# Patient Record
Sex: MALE | Race: WHITE | Employment: UNEMPLOYED | URBAN - METROPOLITAN AREA
[De-identification: names, ages, dates, MRNs, and addresses within clinical notes are randomized per-mention and may not be internally consistent; named-entity substitution may affect disease eponyms.]

---

## 2017-03-27 ENCOUNTER — DOCUMENTATION ONLY (OUTPATIENT)
Dept: NEUROLOGY | Age: 42
End: 2017-03-27

## 2017-06-29 ENCOUNTER — OFFICE VISIT (OUTPATIENT)
Dept: NEUROLOGY | Age: 42
End: 2017-06-29

## 2017-06-29 VITALS
HEIGHT: 77 IN | DIASTOLIC BLOOD PRESSURE: 62 MMHG | BODY MASS INDEX: 19.84 KG/M2 | WEIGHT: 168 LBS | RESPIRATION RATE: 20 BRPM | SYSTOLIC BLOOD PRESSURE: 110 MMHG

## 2017-06-29 DIAGNOSIS — Z09 FOLLOW-UP EXAMINATION FOLLOWING TREATMENT WITH HIGH-RISK MEDICATION: ICD-10-CM

## 2017-06-29 DIAGNOSIS — M85.80 OSTEOPENIA, UNSPECIFIED LOCATION: ICD-10-CM

## 2017-06-29 DIAGNOSIS — R25.1 TREMOR: ICD-10-CM

## 2017-06-29 DIAGNOSIS — G40.209 PARTIAL EPILEPSY WITH IMPAIRMENT OF CONSCIOUSNESS (HCC): Primary | ICD-10-CM

## 2017-06-29 RX ORDER — PROPRANOLOL HYDROCHLORIDE 60 MG/1
60 CAPSULE, EXTENDED RELEASE ORAL DAILY
Qty: 30 CAP | Refills: 6 | Status: SHIPPED | OUTPATIENT
Start: 2017-06-29 | End: 2018-02-27 | Stop reason: SDUPTHER

## 2017-06-29 RX ORDER — LEVETIRACETAM 750 MG/1
TABLET ORAL
Qty: 90 TAB | Refills: 6 | Status: SHIPPED | OUTPATIENT
Start: 2017-06-29 | End: 2018-02-27 | Stop reason: SDUPTHER

## 2017-06-29 RX ORDER — DIVALPROEX SODIUM 500 MG/1
500 TABLET, EXTENDED RELEASE ORAL 2 TIMES DAILY
Qty: 60 TAB | Refills: 6 | Status: SHIPPED | OUTPATIENT
Start: 2017-06-29 | End: 2018-02-27 | Stop reason: SDUPTHER

## 2017-06-29 RX ORDER — ALBUTEROL SULFATE 90 UG/1
AEROSOL, METERED RESPIRATORY (INHALATION)
COMMUNITY

## 2017-06-29 RX ORDER — PRIMIDONE 250 MG/1
250 TABLET ORAL 2 TIMES DAILY
Qty: 60 TAB | Refills: 6 | Status: SHIPPED | OUTPATIENT
Start: 2017-06-29 | End: 2019-02-26 | Stop reason: SDUPTHER

## 2017-06-29 NOTE — PROGRESS NOTES
Neurology Consult      Subjective:      Argenis Yang is a 43 y.o. male  Seen today for the follow up for  generalized seizure disorder and tremor. He has not had any seizures. He will have more tremor with extra caffeine. Current AEDs:   Keppra 750/1500   VPA /500    He is on primidone and propranolol. He is doing well with this. He is fishing a lot. He catches white birch and catfish. Patient reports he is taking his medications properly. We did call [facility] and they said he has not filled prescription in 1 year. Difficult to know if this is the case or not. Will send in refills today and check levels. Recap:  He is doing well. No seizures. He has been fishing still. His tremor is okay. Some days his tremor is worse some days than others. He says it doesn't bother him. Balance is okay. Review of AEDs show that his levels are low. We discussed taking meds appropriately. Pt states he does. Current AEDs:        Current Outpatient Prescriptions   Medication Sig Dispense Refill    albuterol (PROVENTIL HFA, VENTOLIN HFA, PROAIR HFA) 90 mcg/actuation inhaler Take  by inhalation.  divalproex ER (DEPAKOTE ER) 500 mg ER tablet Take 1 Tab by mouth two (2) times a day. 60 Tab 6    levETIRAcetam (KEPPRA) 750 mg tablet Take one tablet in the morning and two tablets at night 90 Tab 6    primidone (MYSOLINE) 250 mg tablet Take 1 Tab by mouth two (2) times a day. 60 Tab 6    sertraline (ZOLOFT) 100 mg tablet Take  by mouth daily.  traZODone (DESYREL) 50 mg tablet Take  by mouth nightly.  levETIRAcetam (KEPPRA) 500 mg tablet Take 2 Tabs by mouth every morning. 90 Tab 3    propranolol LA (INDERAL LA) 60 mg SR capsule Take 1 Cap by mouth daily.  30 Cap 6    propranolol (INDERAL) 40 mg tablet take 1 tablet by mouth once daily 90 Tab 3      No Known Allergies  Past Medical History:   Diagnosis Date    Unspecified epilepsy without mention of intractable epilepsy     essential tremor   MR    Past Surgical History:   Procedure Laterality Date    HX ORTHOPAEDIC        Social Hx:  Smokes tobacco 1 ppd for years  etoh none  Drugs    Family history: Mother is 71 y/o with  Lupus  Father  at age 71 of MI  2 brother and they are healthy. DEXA scan: below normal range for age     No family history on file. Visit Vitals    /62    Resp 20    Ht 6' 5\" (1.956 m)    Wt 76.2 kg (168 lb)    BMI 19.92 kg/m2        Review of Systems:   A comprehensive review of systems was negative except for that written in the History of Present Illness. Keppra 8/15: none detected  VPA 8/15: 27  Neuro Exam:     Appearance: The patient is well developed, well nourished, provides a coherent history and is in no acute distress. Mental Status: Oriented to time, place and person. Mood and affect appropriate. Cranial Nerves:   Intact visual fields. Fundi are benign. GIOVANI, EOM's full, no nystagmus, no ptosis. Facial sensation is normal. Corneal reflexes are intact. Facial movement is symmetric. Hearing is normal bilaterally. Palate is midline with normal sternocleidomastoid and trapezius muscles are normal. Tongue is midline. Motor:  5/5 strength in upper and lower proximal and distal muscles. Normal bulk and tone. No fasciculations. Reflexes:   Deep tendon reflexes 1+/4 and symmetrical.   Sensory:   Grossly intact   Gait:  Normal gait. Tremor:   Mild head tremor   Cerebellar:  No cerebellar signs present. Neurovascular:  Normal heart sounds and regular rhythm, peripheral pulses intact, and no carotid bruits. Assessment:   Mr. Narendra Fox was seen today for the evaluation of generalized seizures and tremors. No seizures reported, and tolerating medication well. Cont current doses of keppra and Depakote. Pt states compliant. No toxic side effects. Will not change doses today. Tremors stable.       Plan:     -continuing on Keppra 750/1500 and Depakote 500/500 for seizures.  -cont Inderal LA 60mg daily and primidone 250mg BID for tremors. -patient was counseled to stop smoking tobacco.  -DEXA scan below normal range- will do Vit D and Calcium  -Pt counseled on taking meds appropriately  -recheck levels today    -follow up in 1 year    Signed by :  Joao Pacheco MD 6/29/2017     This note was created using voice recognition software. Despite editing, there may be syntax errors. This note will not be viewable in [address]

## 2017-06-29 NOTE — PATIENT INSTRUCTIONS
Learning About Living Trav  What is a living will? A living will is a legal form you use to write down the kind of care you want at the end of your life. It is used by the health professionals who will treat you if you aren't able to decide for yourself. If you put your wishes in writing, your loved ones and others will know what kind of care you want. They won't need to guess. This can ease your mind and be helpful to others. A living will is not the same as an estate or property will. An estate will explains what you want to happen with your money and property after you die. Is a living will a legal document? A living will is a legal document. Each state has its own laws about living sousa. If you move to another state, make sure that your living will is legal in the state where you now live. Or you might use a universal form that has been approved by many states. This kind of form can sometimes be completed and stored online. Your electronic copy will then be available wherever you have a connection to the Internet. In most cases, doctors will respect your wishes even if you have a form from a different state. · You don't need an  to complete a living will. But legal advice can be helpful if your state's laws are unclear, your health history is complicated, or your family can't agree on what should be in your living will. · You can change your living will at any time. Some people find that their wishes about end-of-life care change as their health changes. · In addition to making a living will, think about completing a medical power of  form. This form lets you name the person you want to make end-of-life treatment decisions for you (your \"health care agent\") if you're not able to. Many hospitals and nursing homes will give you the forms you need to complete a living will and a medical power of .   · Your living will is used only if you can't make or communicate decisions for yourself anymore. If you become able to make decisions again, you can accept or refuse any treatment, no matter what you wrote in your living will. · Your state may offer an online registry. This is a place where you can store your living will online so the doctors and nurses who need to treat you can find it right away. What should you think about when creating a living will? Talk about your end-of-life wishes with your family members and your doctor. Let them know what you want. That way the people making decisions for you won't be surprised by your choices. Think about these questions as you make your living will:  · Do you know enough about life support methods that might be used? If not, talk to your doctor so you know what might be done if you can't breathe on your own, your heart stops, or you're unable to swallow. · What things would you still want to be able to do after you receive life-support methods? Would you want to be able to walk? To speak? To eat on your own? To live without the help of machines? · If you have a choice, where do you want to be cared for? In your home? At a hospital or nursing home? · Do you want certain Roman Catholic practices performed if you become very ill? · If you have a choice at the end of your life, where would you prefer to die? At home? In a hospital or nursing home? Somewhere else? · Would you prefer to be buried or cremated? · Do you want your organs to be donated after you die? What should you do with your living will? · Make sure that your family members and your health care agent have copies of your living will. · Give your doctor a copy of your living will to keep in your medical record. If you have more than one doctor, make sure that each one has a copy. · You may want to put a copy of your living will where it can be easily found. Where can you learn more? Go to http://marky-jorge l.info/.   Enter W056 in the search box to learn more about \"Learning About Living Trav. \"  Current as of: August 8, 2016  Content Version: 11.3  © 4879-0632 [employer]. Care instructions adapted under license by [employer] (which disclaims liability or warranty for this information). If you have questions about a medical condition or this instruction, always ask your healthcare professional. [address] any warranty or liability for your use of this information. Advance Directives: Care Instructions  Your Care Instructions  An advance directive is a legal way to state your wishes at the end of your life. It tells your family and your doctor what to do if you can no longer say what you want. There are two main types of advance directives. You can change them any time that your wishes change. · A living will tells your family and your doctor your wishes about life support and other treatment. · A durable power of  for health care lets you name a person to make treatment decisions for you when you can't speak for yourself. This person is called a health care agent. If you do not have an advance directive, decisions about your medical care may be made by a doctor or a  who doesn't know you. It may help to think of an advance directive as a gift to the people who care for you. If you have one, they won't have to make tough decisions by themselves. Follow-up care is a key part of your treatment and safety. Be sure to make and go to all appointments, and call your doctor if you are having problems. It's also a good idea to know your test results and keep a list of the medicines you take. How can you care for yourself at home? · Discuss your wishes with your loved ones and your doctor. This way, there are no surprises. · Many states have a unique form. Or you might use a universal form that has been approved by many states. This kind of form can sometimes be completed and stored online.  Your electronic copy will then be available wherever you have a connection to the Internet. In most cases, doctors will respect your wishes even if you have a form from a different state. · You don't need a  to do an advance directive. But you may want to get legal advice. · Think about these questions when you prepare an advance directive:  ¨ Who do you want to make decisions about your medical care if you are not able to? Many people choose a family member or close friend. ¨ Do you know enough about life support methods that might be used? If not, talk to your doctor so you understand. ¨ What are you most afraid of that might happen? You might be afraid of having pain, losing your independence, or being kept alive by machines. ¨ Where would you prefer to die? Choices include your home, a hospital, or a nursing home. ¨ Would you like to have information about hospice care to support you and your family? ¨ Do you want to donate organs when you die? ¨ Do you want certain Amish practices performed before you die? If so, put your wishes in the advance directive. · Read your advance directive every year, and make changes as needed. When should you call for help? Be sure to contact your doctor if you have any questions. Where can you learn more? Go to http://marky-jorge l.info/. Enter R264 in the search box to learn more about \"Advance Directives: Care Instructions. \"  Current as of: November 17, 2016  Content Version: 11.3  © 4510-5176 [employer]. Care instructions adapted under license by [employer] (which disclaims liability or warranty for this information). If you have questions about a medical condition or this instruction, always ask your healthcare professional. [place] [address] any warranty or liability for your use of this information.   [facility]   Statement to Patients  April 1, 2014      In an effort to ensure the large volume of patient prescription refills is processed in the most efficient and expeditious manner, we are asking our patients to assist us by calling your Pharmacy for all prescription refills, this will include also your  Mail Order Pharmacy. The pharmacy will contact our office electronically to continue the refill process. Please do not wait until the last minute to call your pharmacy. We need at least 48 hours (2days) to fill prescriptions. We also encourage you to call your pharmacy before going to  your prescription to make sure it is ready. With regard to controlled substance prescription refill requests (narcotic refills) that need to be picked up at our office, we ask your cooperation by providing us with at least 72 hours (3days) notice that you will need a refill. We will not refill narcotic prescription refill requests after 4:00pm on any weekday, Monday through Thursday, or after 2:00pm on Fridays, or on the weekends. We encourage everyone to explore another way of getting your prescription refill request processed using [employer], our patient web portal through our electronic medical record system. [employer]i is an efficient and effective way to communicate your medication request directly to the office and  downloadable as an angie on your smart phone . [employer] also features a review functionality that allows you to view your medication list as well as leave messages for your physician. Are you ready to get connected? If so please review the attatched instructions or speak to any of our staff to get you set up right away! Thank you so much for your cooperation. Should you have any questions please contact our Practice Administrator.     The Physicians and Staff,  [facility]

## 2017-06-29 NOTE — MR AVS SNAPSHOT
Visit Information Date & Time Provider Department Dept. Phone Encounter #  
 6/29/2017  9:40 AM Ray Green MD [facility] [phone] [id number] Follow-up Instructions Return in about 1 year (around 6/29/2018). Upcoming Health Maintenance Date Due Pneumococcal 19-64 Medium Risk (1 of 1 - PPSV23) 3/8/1994 DTaP/Tdap/Td series (1 - Tdap) 3/8/1996 INFLUENZA AGE 9 TO ADULT 8/1/2017 Allergies as of 6/29/2017  Review Complete On: 1/21/2016 By: Ray Green MD  
 No Known Allergies Current Immunizations  Never Reviewed No immunizations on file. Not reviewed this visit You Were Diagnosed With   
  
 Codes Comments Partial epilepsy with impairment of consciousness ([facility] [address]    -  Primary ICD-10-CM: M31.446 ICD-9-CM: 345.40 Tremor     ICD-10-CM: R25.1 ICD-9-CM: 781.0 Osteopenia, unspecified location     ICD-10-CM: M85.80 ICD-9-CM: 733.90 Follow-up examination following treatment with high-risk medication     ICD-10-CM: Z09 ICD-9-CM: V67.51 Vitals BP Resp Height(growth percentile) Weight(growth percentile) BMI Smoking Status 110/62 20 6' 5\" (1.956 m) 168 lb (76.2 kg) 19.92 kg/m2 Current Every Day Smoker Vitals History BMI and BSA Data Body Mass Index Body Surface Area  
 19.92 kg/m 2 2.03 m 2 Preferred Pharmacy Pharmacy Name Phone [facility], [address] [facility] [phone] Your Updated Medication List  
  
   
This list is accurate as of: 6/29/17 10:09 AM.  Always use your most recent med list.  
  
  
  
  
 albuterol 90 mcg/actuation inhaler Commonly known as:  PROVENTIL HFA, VENTOLIN HFA, PROAIR HFA Take  by inhalation. divalproex  mg ER tablet Commonly known as:  DEPAKOTE ER Take 1 Tab by mouth two (2) times a day. * levETIRAcetam 500 mg tablet Commonly known as:  KEPPRA Take 2 Tabs by mouth every morning. * levETIRAcetam 750 mg tablet Commonly known as:  KEPPRA Take one tablet in the morning and two tablets at night  
  
 primidone 250 mg tablet Commonly known as: MYSOLINE Take 1 Tab by mouth two (2) times a day. * propranolol 40 mg tablet Commonly known as:  INDERAL  
take 1 tablet by mouth once daily * propranolol LA 60 mg SR capsule Commonly known as:  INDERAL LA Take 1 Cap by mouth daily. traZODone 50 mg tablet Commonly known as:  Redge Mclean Take  by mouth nightly. ZOLOFT 100 mg tablet Generic drug:  sertraline Take  by mouth daily. * Notice: This list has 4 medication(s) that are the same as other medications prescribed for you. Read the directions carefully, and ask your doctor or other care provider to review them with you. Prescriptions Sent to Pharmacy Refills  
 propranolol LA (INDERAL LA) 60 mg SR capsule 6 Sig: Take 1 Cap by mouth daily. Class: Normal  
 Pharmacy: [place] [address] [address] [facility] Ph #: [phone] Route: Oral  
 primidone (MYSOLINE) 250 mg tablet 6 Sig: Take 1 Tab by mouth two (2) times a day. Class: Normal  
 Pharmacy: [place] [address] [address] [facility] Ph #: [phone] Route: Oral  
 levETIRAcetam (KEPPRA) 750 mg tablet 6 Sig: Take one tablet in the morning and two tablets at night Class: Normal  
 Pharmacy: [place] [address] [address] [facility] Ph #: [phone]  
 divalproex ER (DEPAKOTE ER) 500 mg ER tablet 6 Sig: Take 1 Tab by mouth two (2) times a day. Class: Normal  
 Pharmacy: [place] [id number], [facility] Ph #: [phone] Route: Oral  
  
We Performed the Following CBC WITH AUTOMATED DIFF [24199 CPT(R)] METABOLIC PANEL, COMPREHENSIVE [10911 CPT(R)] VALPROIC ACID [09528 CPT(R)] Follow-up Instructions Return in about 1 year (around 6/29/2018). Patient Instructions [address] What is a living will? A living will is a legal form you use to write down the kind of care you want at the end of your life. It is used by the health professionals who will treat you if you aren't able to decide for yourself. If you put your wishes in writing, your loved ones and others will know what kind of care you want. They won't need to guess. This can ease your mind and be helpful to others. A living will is not the same as an estate or property will. An estate will explains what you want to happen with your money and property after you die. Is a living will a legal document? A living will is a legal document. Each state has its own laws about living sousa. If you move to another state, make sure that your living will is legal in the state where you now live. Or you might use a universal form that has been approved by many states. This kind of form can sometimes be completed and stored online. Your electronic copy will then be available wherever you have a connection to the Internet. In most cases, doctors will respect your wishes even if you have a form from a different state. · You don't need an  to complete a living will. But legal advice can be helpful if your state's laws are unclear, your health history is complicated, or your family can't agree on what should be in your living will. · You can change your living will at any time. Some people find that their wishes about end-of-life care change as their health changes. · In addition to making a living will, think about completing a medical power of  form. This form lets you name the person you want to make end-of-life treatment decisions for you (your \"health care agent\") if you're not able to.  Many hospitals and nursing homes will give you the forms you need to complete a living will and a medical power of . · Your living will is used only if you can't make or communicate decisions for yourself anymore. If you become able to make decisions again, you can accept or refuse any treatment, no matter what you wrote in your living will. · Your state may offer an online registry. This is a place where you can store your living will online so the doctors and nurses who need to treat you can find it right away. What should you think about when creating a living will? Talk about your end-of-life wishes with your family members and your doctor. Let them know what you want. That way the people making decisions for you won't be surprised by your choices. Think about these questions as you make your living will: · Do you know enough about life support methods that might be used? If not, talk to your doctor so you know what might be done if you can't breathe on your own, your heart stops, or you're unable to swallow. · What things would you still want to be able to do after you receive life-support methods? Would you want to be able to walk? To speak? To eat on your own? To live without the help of machines? · If you have a choice, where do you want to be cared for? In your home? At a hospital or nursing home? · Do you want certain Alevism practices performed if you become very ill? · If you have a choice at the end of your life, where would you prefer to die? At home? In a hospital or nursing home? Somewhere else? · Would you prefer to be buried or cremated? · Do you want your organs to be donated after you die? What should you do with your living will? · Make sure that your family members and your health care agent have copies of your living will. · Give your doctor a copy of your living will to keep in your medical record. If you have more than one doctor, make sure that each one has a copy. · You may want to put a copy of your living will where it can be easily found. Where can you learn more? Go to http://marky-jorge l.info/. Enter A406 in the search box to learn more about \"Learning About Living Trav. \" Current as of: August 8, 2016 Content Version: 11.3 © 4724-9147 [employer]rate. Care instructions adapted under license by [employer] (which disclaims liability or warranty for this information). If you have questions about a medical condition or this instruction, always ask your healthcare professional. [place] [address] any warranty or liability for your use of this information. Advance Directives: Care Instructions Your Care Instructions An advance directive is a legal way to state your wishes at the end of your life. It tells your family and your doctor what to do if you can no longer say what you want. There are two main types of advance directives. You can change them any time that your wishes change. · A living will tells your family and your doctor your wishes about life support and other treatment. · A durable power of  for health care lets you name a person to make treatment decisions for you when you can't speak for yourself. This person is called a health care agent. If you do not have an advance directive, decisions about your medical care may be made by a doctor or a  who doesn't know you. It may help to think of an advance directive as a gift to the people who care for you. If you have one, they won't have to make tough decisions by themselves. Follow-up care is a key part of your treatment and safety. Be sure to make and go to all appointments, and call your doctor if you are having problems. It's also a good idea to know your test results and keep a list of the medicines you take. How can you care for yourself at home? · Discuss your wishes with your loved ones and your doctor.  This way, there are no surprises. · Many states have a unique form. Or you might use a universal form that has been approved by many states. This kind of form can sometimes be completed and stored online. Your electronic copy will then be available wherever you have a connection to the Internet. In most cases, doctors will respect your wishes even if you have a form from a different state. · You don't need a  to do an advance directive. But you may want to get legal advice. · Think about these questions when you prepare an advance directive: ¨ Who do you want to make decisions about your medical care if you are not able to? Many people choose a family member or close friend. ¨ Do you know enough about life support methods that might be used? If not, talk to your doctor so you understand. ¨ What are you most afraid of that might happen? You might be afraid of having pain, losing your independence, or being kept alive by machines. ¨ Where would you prefer to die? Choices include your home, a hospital, or a nursing home. ¨ Would you like to have information about hospice care to support you and your family? ¨ Do you want to donate organs when you die? ¨ Do you want certain Pentecostalism practices performed before you die? If so, put your wishes in the advance directive. · Read your advance directive every year, and make changes as needed. When should you call for help? Be sure to contact your doctor if you have any questions. Where can you learn more? Go to http://marky-jorge l.info/. Enter R264 in the search box to learn more about \"Advance Directives: Care Instructions. \" Current as of: November 17, 2016 Content Version: 11.3 © 6870-7869 Healthwise, Incorporated. Care instructions adapted under license by [employer] (which disclaims liability or warranty for this information).  If you have questions about a medical condition or this instruction, always ask your healthcare professional. [place] [address] any warranty or liability for your use of this information. PRESCRIPTION REFILL POLICY [facility] Neurology [facility] Statement to Patients April 1, 2014 In an effort to ensure the large volume of patient prescription refills is processed in the most efficient and expeditious manner, we are asking our patients to assist us by calling your Pharmacy for all prescription refills, this will include also your  Mail Order Pharmacy. The pharmacy will contact our office electronically to continue the refill process. Please do not wait until the last minute to call your pharmacy. We need at least 48 hours (2days) to fill prescriptions. We also encourage you to call your pharmacy before going to  your prescription to make sure it is ready. With regard to controlled substance prescription refill requests (narcotic refills) that need to be picked up at our office, we ask your cooperation by providing us with at least 72 hours (3days) notice that you will need a refill. We will not refill narcotic prescription refill requests after 4:00pm on any weekday, Monday through Thursday, or after 2:00pm on Fridays, or on the weekends. We encourage everyone to explore another way of getting your prescription refill request processed using [employer], our patient web portal through our electronic medical record system. [employer] is an efficient and effective way to communicate your medication request directly to the office and  downloadable as an angie on your smart phone . [employer] also features a review functionality that allows you to view your medication list as well as leave messages for your physician. Are you ready to get connected? If so please review the attatched instructions or speak to any of our staff to get you set up right away! Thank you so much for your cooperation.  Should you have any questions please contact our Practice Administrator. The Physicians and Staff,  [facility] Introducing [facility]! [facility] introduces [employer] patient portal. Now you can access parts of your medical record, email your doctor's office, and request medication refills online. 1. In your internet browser, go to [email or web address] 2. Click on the First Time User? Click Here link in the Sign In box. You will see the New Member Sign Up page. 3. Enter your [employer] Access Code exactly as it appears below. You will not need to use this code after youve completed the sign-up process. If you do not sign up before the expiration date, you must request a new code. · [employer] Access Code: [email or web address] Expires: 9/27/2017 10:01 AM 
 
4. Enter the last four digits of your Social Security Number (xxxx) and Date of Birth (mm/dd/yyyy) as indicated and click Submit. You will be taken to the next sign-up page. 5. Create a [employer] ID. This will be your [employer] login ID and cannot be changed, so think of one that is secure and easy to remember. 6. Create a [employer] password. You can change your password at any time. 7. Enter your Password Reset Question and Answer. This can be used at a later time if you forget your password. 8. Enter your e-mail address. You will receive e-mail notification when new information is available in [address] 9. Click Sign Up. You can now view and download portions of your medical record. 10. Click the Download Summary menu link to download a portable copy of your medical information. If you have questions, please visit the Frequently Asked Questions section of the [employer] website. Remember, [employer] is NOT to be used for urgent needs. For medical emergencies, dial 911. Now available from your iPhone and Android! Please provide this summary of care documentation to your next provider. Your primary care clinician is listed as Moses Crockett. If you have any questions after today's visit, please call [phone].

## 2017-06-29 NOTE — LETTER
Neurology Consult Subjective:  
  
Latisha Babin is a 43 y.o. male  Seen today for the follow up for  generalized seizure disorder and tremor. He has not had any seizures. He will have more tremor with extra caffeine. Current AEDs: 
 Keppra 750/1500 VPA /500 He is on primidone and propranolol. He is doing well with this. He is fishing a lot. He catches white birch and catfish. Patient reports he is taking his medications properly. We did call [facility] and they said he has not filled prescription in 1 year. Difficult to know if this is the case or not. Will send in refills today and check levels. Recap: 
He is doing well. No seizures. He has been fishing still. His tremor is okay. Some days his tremor is worse some days than others. He says it doesn't bother him. Balance is okay. Review of AEDs show that his levels are low. We discussed taking meds appropriately. Pt states he does. Current AEDs: 
 
 
 
Current Outpatient Prescriptions Medication Sig Dispense Refill  albuterol (PROVENTIL HFA, VENTOLIN HFA, PROAIR HFA) 90 mcg/actuation inhaler Take  by inhalation.  divalproex ER (DEPAKOTE ER) 500 mg ER tablet Take 1 Tab by mouth two (2) times a day. 60 Tab 6  levETIRAcetam (KEPPRA) 750 mg tablet Take one tablet in the morning and two tablets at night 90 Tab 6  primidone (MYSOLINE) 250 mg tablet Take 1 Tab by mouth two (2) times a day. 60 Tab 6  sertraline (ZOLOFT) 100 mg tablet Take  by mouth daily.  traZODone (DESYREL) 50 mg tablet Take  by mouth nightly.  levETIRAcetam (KEPPRA) 500 mg tablet Take 2 Tabs by mouth every morning. 90 Tab 3  propranolol LA (INDERAL LA) 60 mg SR capsule Take 1 Cap by mouth daily. 30 Cap 6  propranolol (INDERAL) 40 mg tablet take 1 tablet by mouth once daily 90 Tab 3 No Known Allergies Past Medical History:  
Diagnosis Date  Unspecified epilepsy without mention of intractable epilepsy essential tremor MR Past Surgical History:  
Procedure Laterality Date  HX ORTHOPAEDIC Social Hx: 
Smokes tobacco 1 ppd for years 
etoh none Drugs Family history: Mother is 73 y/o with  Lupus Father  at age 71 of MI 
2 brother and they are healthy. DEXA scan: below normal range for age No family history on file. Visit Vitals  /62  Resp 20  
 Ht 6' 5\" (1.956 m)  Wt 76.2 kg (168 lb)  BMI 19.92 kg/m2 Review of Systems: A comprehensive review of systems was negative except for that written in the History of Present Illness. Keppra 8/15: none detected VPA 8/15: 27 Neuro Exam:  
 
Appearance: The patient is well developed, well nourished, provides a coherent history and is in no acute distress. Mental Status: Oriented to time, place and person. Mood and affect appropriate. Cranial Nerves:   Intact visual fields. Fundi are benign. IGOVANI, EOM's full, no nystagmus, no ptosis. Facial sensation is normal. Corneal reflexes are intact. Facial movement is symmetric. Hearing is normal bilaterally. Palate is midline with normal sternocleidomastoid and trapezius muscles are normal. Tongue is midline. Motor:  5/5 strength in upper and lower proximal and distal muscles. Normal bulk and tone. No fasciculations. Reflexes:   Deep tendon reflexes 1+/4 and symmetrical.  
Sensory:   Grossly intact Gait:  Normal gait. Tremor:   Mild head tremor Cerebellar:  No cerebellar signs present. Neurovascular:  Normal heart sounds and regular rhythm, peripheral pulses intact, and no carotid bruits. Assessment:  
Mr. Narendra Fox was seen today for the evaluation of generalized seizures and tremors. No seizures reported, and tolerating medication well. Cont current doses of keppra and Depakote. Pt states compliant. No toxic side effects. Will not change doses today. Tremors stable. Plan:  
 
-continuing on Keppra 750/1500 and Depakote 500/500 for seizures. -cont Inderal LA 60mg daily and primidone 250mg BID for tremors. -patient was counseled to stop smoking tobacco. 
-DEXA scan below normal range- will do Vit D and Calcium 
-Pt counseled on taking meds appropriately 
-recheck levels today 
 
-follow up in 1 year Signed by :  Giuseppe Medina MD 6/29/2017 This note was created using voice recognition software. Despite editing, there may be syntax errors. This note will not be viewable in [address]

## 2018-02-27 DIAGNOSIS — G40.209 PARTIAL EPILEPSY WITH IMPAIRMENT OF CONSCIOUSNESS (HCC): ICD-10-CM

## 2018-02-27 DIAGNOSIS — Z09 FOLLOW-UP EXAMINATION FOLLOWING TREATMENT WITH HIGH-RISK MEDICATION: ICD-10-CM

## 2018-02-27 DIAGNOSIS — R25.1 TREMOR: ICD-10-CM

## 2018-02-27 RX ORDER — PROPRANOLOL HYDROCHLORIDE 60 MG/1
CAPSULE, EXTENDED RELEASE ORAL
Qty: 30 CAP | Refills: 6 | Status: SHIPPED | OUTPATIENT
Start: 2018-02-27 | End: 2019-02-26 | Stop reason: SDUPTHER

## 2018-02-27 RX ORDER — LEVETIRACETAM 750 MG/1
TABLET ORAL
Qty: 90 TAB | Refills: 6 | Status: SHIPPED | OUTPATIENT
Start: 2018-02-27 | End: 2018-12-28 | Stop reason: SDUPTHER

## 2018-02-27 RX ORDER — DIVALPROEX SODIUM 500 MG/1
TABLET, EXTENDED RELEASE ORAL
Qty: 60 TAB | Refills: 6 | Status: SHIPPED | OUTPATIENT
Start: 2018-02-27 | End: 2018-12-28 | Stop reason: SDUPTHER

## 2018-12-28 DIAGNOSIS — G40.209 PARTIAL EPILEPSY WITH IMPAIRMENT OF CONSCIOUSNESS (HCC): ICD-10-CM

## 2018-12-28 DIAGNOSIS — R25.1 TREMOR: ICD-10-CM

## 2018-12-28 DIAGNOSIS — Z09 FOLLOW-UP EXAMINATION FOLLOWING TREATMENT WITH HIGH-RISK MEDICATION: ICD-10-CM

## 2018-12-28 RX ORDER — LEVETIRACETAM 750 MG/1
TABLET ORAL
Qty: 90 TAB | Refills: 6 | Status: SHIPPED | OUTPATIENT
Start: 2018-12-28 | End: 2019-02-26 | Stop reason: SDUPTHER

## 2018-12-28 RX ORDER — DIVALPROEX SODIUM 500 MG/1
TABLET, EXTENDED RELEASE ORAL
Qty: 60 TAB | Refills: 6 | Status: SHIPPED | OUTPATIENT
Start: 2018-12-28 | End: 2019-02-26 | Stop reason: SDUPTHER

## 2019-02-26 ENCOUNTER — OFFICE VISIT (OUTPATIENT)
Dept: NEUROLOGY | Age: 44
End: 2019-02-26

## 2019-02-26 VITALS
WEIGHT: 157 LBS | RESPIRATION RATE: 18 BRPM | BODY MASS INDEX: 18.54 KG/M2 | SYSTOLIC BLOOD PRESSURE: 144 MMHG | DIASTOLIC BLOOD PRESSURE: 80 MMHG | OXYGEN SATURATION: 97 % | HEART RATE: 105 BPM | HEIGHT: 77 IN

## 2019-02-26 DIAGNOSIS — Z09 FOLLOW-UP EXAMINATION FOLLOWING TREATMENT WITH HIGH-RISK MEDICATION: ICD-10-CM

## 2019-02-26 DIAGNOSIS — G40.209 PARTIAL EPILEPSY WITH IMPAIRMENT OF CONSCIOUSNESS (HCC): Primary | ICD-10-CM

## 2019-02-26 DIAGNOSIS — R25.1 TREMOR: ICD-10-CM

## 2019-02-26 RX ORDER — DIVALPROEX SODIUM 500 MG/1
TABLET, EXTENDED RELEASE ORAL
Qty: 60 TAB | Refills: 6 | Status: SHIPPED | OUTPATIENT
Start: 2019-02-26 | End: 2019-08-26 | Stop reason: SDUPTHER

## 2019-02-26 RX ORDER — PRIMIDONE 250 MG/1
250 TABLET ORAL 2 TIMES DAILY
Qty: 60 TAB | Refills: 6 | Status: SHIPPED | OUTPATIENT
Start: 2019-02-26 | End: 2019-08-26 | Stop reason: SDUPTHER

## 2019-02-26 RX ORDER — PROPRANOLOL HYDROCHLORIDE 60 MG/1
CAPSULE, EXTENDED RELEASE ORAL
Qty: 30 CAP | Refills: 6 | Status: SHIPPED | OUTPATIENT
Start: 2019-02-26 | End: 2019-08-26

## 2019-02-26 RX ORDER — LEVETIRACETAM 750 MG/1
TABLET ORAL
Qty: 90 TAB | Refills: 6 | Status: SHIPPED | OUTPATIENT
Start: 2019-02-26 | End: 2019-08-26 | Stop reason: SDUPTHER

## 2019-02-26 NOTE — PATIENT INSTRUCTIONS
PRESCRIPTION REFILL POLICY [facility] Statement to Patients April 1, 2014 In an effort to ensure the large volume of patient prescription refills is processed in the most efficient and expeditious manner, we are asking our patients to assist us by calling your Pharmacy for all prescription refills, this will include also your  Mail Order Pharmacy. The pharmacy will contact our office electronically to continue the refill process. Please do not wait until the last minute to call your pharmacy. We need at least 48 hours (2days) to fill prescriptions. We also encourage you to call your pharmacy before going to  your prescription to make sure it is ready. With regard to controlled substance prescription refill requests (narcotic refills) that need to be picked up at our office, we ask your cooperation by providing us with at least 72 hours (3days) notice that you will need a refill. We will not refill narcotic prescription refill requests after 4:00pm on any weekday, Monday through Thursday, or after 2:00pm on Fridays, or on the weekends. We encourage everyone to explore another way of getting your prescription refill request processed using [employer], our patient web portal through our electronic medical record system. [employer] is an efficient and effective way to communicate your medication request directly to the office and  downloadable as an angie on your smart phone . [employer] also features a review functionality that allows you to view your medication list as well as leave messages for your physician. Are you ready to get connected? If so please review the attatched instructions or speak to any of our staff to get you set up right away! Thank you so much for your cooperation. Should you have any questions please contact our Practice Administrator. The Physicians and Staff,  [facility] Patient Instruction Plan/ Result Policy If we have ordered testing for you, know that; \"NO NEWS IS GOOD NEWS! \" It is our policy that we know longer call patients with results, nor do we  give test results over the phone. We schedule follow up appointments so that your results can be discussed in person. This allows you to address any questions you have regarding the results. If something of concern is revealed on your test, we will contact you to discuss the matter and if needed schedule a sooner follow up appointment. Additionally, results may be found by using the My Chart feature and one of our patient service representatives at the  can give you instructions on how to access this feature to utilize our electronic medical record system. Thank you for your understanding.

## 2019-02-26 NOTE — LETTER
Neurology Consult Subjective:  
  
Rosalinda Fay is a 37 y.o. male  Seen today for the follow up for  generalized seizure disorder and tremor. He reports he is doing well. He says he has not had any seizures. He has not had any seizures. He will have more tremor with extra caffeine. Current AEDs: 
 Keppra 750/1500 VPA /500 Last VPA free level was 5.4 on 11/17. He is on primidone and propranolol. He is doing well with this. When he takes too much coffee he will have worsening tremors. He has a 12 cup pot of coffee and would drink this all. He says it now only makes 4 cups. When he gets upset he shakes too. He has not been fishing much due to the fish being too small. He does enjoy it. He likes too watch TV. He tries to read but gets frustrated trying to process. When he reads he can't process. He follows with psychiatry. He is only on Zoloft now that he is aware of. 
 
Recap:  
He is fishing a lot. He catches white birch and catfish. Patient reports he is taking his medications properly. We did call [facility] and they said he has not filled prescription in 1 year. Difficult to know if this is the case or not. Will send in refills today and check levels. Current Outpatient Medications Medication Sig Dispense Refill  divalproex ER (DEPAKOTE ER) 500 mg ER tablet take 1 tablet by mouth twice a day 60 Tab 6  levETIRAcetam (KEPPRA) 750 mg tablet TAKE 1 TABLET BY MOUTH EVERY MORNING AND 2 TABLETS BY MOUTH AT NIGHT. 90 Tab 6  propranolol LA (INDERAL LA) 60 mg SR capsule take 1 capsule by mouth once daily 30 Cap 6  
 albuterol (PROVENTIL HFA, VENTOLIN HFA, PROAIR HFA) 90 mcg/actuation inhaler Take  by inhalation.  primidone (MYSOLINE) 250 mg tablet Take 1 Tab by mouth two (2) times a day. 60 Tab 6  propranolol (INDERAL) 40 mg tablet take 1 tablet by mouth once daily 90 Tab 3  
 sertraline (ZOLOFT) 100 mg tablet Take  by mouth daily.  traZODone (DESYREL) 50 mg tablet Take  by mouth nightly.  levETIRAcetam (KEPPRA) 500 mg tablet Take 2 Tabs by mouth every morning. 90 Tab 3 No Known Allergies Past Medical History:  
Diagnosis Date  Unspecified epilepsy without mention of intractable epilepsy   
 essential tremor MR Past Surgical History:  
Procedure Laterality Date  HX ORTHOPAEDIC Social Hx: 
Smokes tobacco 1 ppd for years 
etoh none Drugs Family history: Mother is 71 y/o with  Lupus Father  at age 71 of [place] 
2 brother and they are healthy. DEXA scan: below normal range for age No family history on file. Visit Vitals /80 Pulse (!) 105 Resp 18 Ht 6' 5\" (1.956 m) Wt 71.2 kg (157 lb) SpO2 97% BMI 18.62 kg/m² Review of Systems: A comprehensive review of systems was negative except for that written in the History of Present Illness. Keppra 8/15: none detected VPA 8/15: 27 Neuro Exam:  
 
Appearance: The patient is well developed, well nourished, provides a coherent history and is in no acute distress. Mental Status: Oriented to time, place and person. Mood and affect appropriate. Cranial Nerves:   Intact visual fields. Fundi are benign. GIOVANI, EOM's full, no nystagmus, no ptosis. Facial sensation is normal. Corneal reflexes are intact. Facial movement is symmetric. Hearing is normal bilaterally. Palate is midline with normal sternocleidomastoid and trapezius muscles are normal. Tongue is midline. Motor:  5/5 strength in upper and lower proximal and distal muscles. Normal bulk and tone. No fasciculations. Reflexes:   Deep tendon reflexes 1+/4 and symmetrical.  
Sensory:   Grossly intact Gait:  Normal gait. Tremor:   Mild head tremor Cerebellar:  No cerebellar signs present. Neurovascular:  Normal heart sounds and regular rhythm, peripheral pulses intact, and no carotid bruits. Assessment: Mr. Weston Churchill was seen today for the evaluation of generalized seizures and tremors. No seizures reported, and tolerating medication well. He reports he is taking medications as directed. Will refill today. Discussed with patient that I would like updated blood work. He states that he can get this at his primary care physician and send it to us. No toxic side effects of medications. Essential tremor is stable with primidone and propranolol. With stress and too much caffeine it is exacerbated. Plan:  
 
-continuing on Keppra 750/1500 and Depakote 500/500 for seizures. Refill given for both 
-cont Inderal LA 60mg daily and primidone 250mg BID for tremors. Refill given for both 
-patient was counseled to stop smoking tobacco. 
-DEXA scan below normal range- will do Vit D and Calcium 
-Pt counseled on taking meds appropriately. -recheck levels today. Patient will get this at PCP 
 
-follow up in 6 months with Dr. Randy Canales Signed by :  Pearl Alcaraz MD 2/26/2019 Medications and side effects discussed with patient in detail. With any new medications prescribed, patient was given instructions on administration and side effects. Written medication information was provided to the patient as well. This note was created using voice recognition software. Despite editing, there may be syntax errors. This note will not be viewable in [address] Chief Complaint Patient presents with  Seizure Visit Vitals /80 Pulse (!) 105 Resp 18 Ht 6' 5\" (1.956 m) Wt 71.2 kg (157 lb) SpO2 97% BMI 18.62 kg/m²

## 2019-02-26 NOTE — PROGRESS NOTES
Neurology Consult Subjective:  
  
Brant James is a 37 y.o. male  Seen today for the follow up for  generalized seizure disorder and tremor. He reports he is doing well. He says he has not had any seizures. He has not had any seizures. He will have more tremor with extra caffeine. Current AEDs: 
 Keppra 750/1500 VPA /500 Last VPA free level was 5.4 on 11/17. He is on primidone and propranolol. He is doing well with this. When he takes too much coffee he will have worsening tremors. He has a 12 cup pot of coffee and would drink this all. He says it now only makes 4 cups. When he gets upset he shakes too. He has not been fishing much due to the fish being too small. He does enjoy it. He likes too watch TV. He tries to read but gets frustrated trying to process. When he reads he can't process. He follows with psychiatry. He is only on Zoloft now that he is aware of. 
 
Recap:  
He is fishing a lot. He catches white birch and catfish. Patient reports he is taking his medications properly. We did call [facility] and they said he has not filled prescription in 1 year. Difficult to know if this is the case or not. Will send in refills today and check levels. Current Outpatient Medications Medication Sig Dispense Refill  divalproex ER (DEPAKOTE ER) 500 mg ER tablet take 1 tablet by mouth twice a day 60 Tab 6  levETIRAcetam (KEPPRA) 750 mg tablet TAKE 1 TABLET BY MOUTH EVERY MORNING AND 2 TABLETS BY MOUTH AT NIGHT. 90 Tab 6  propranolol LA (INDERAL LA) 60 mg SR capsule take 1 capsule by mouth once daily 30 Cap 6  
 albuterol (PROVENTIL HFA, VENTOLIN HFA, PROAIR HFA) 90 mcg/actuation inhaler Take  by inhalation.  primidone (MYSOLINE) 250 mg tablet Take 1 Tab by mouth two (2) times a day. 60 Tab 6  propranolol (INDERAL) 40 mg tablet take 1 tablet by mouth once daily 90 Tab 3  
 sertraline (ZOLOFT) 100 mg tablet Take  by mouth daily.  traZODone (DESYREL) 50 mg tablet Take  by mouth nightly.  levETIRAcetam (KEPPRA) 500 mg tablet Take 2 Tabs by mouth every morning. 90 Tab 3 No Known Allergies Past Medical History:  
Diagnosis Date  Unspecified epilepsy without mention of intractable epilepsy   
 essential tremor MR Past Surgical History:  
Procedure Laterality Date  HX ORTHOPAEDIC Social Hx: 
Smokes tobacco 1 ppd for years 
etoh none Drugs Family history: Mother is 71 y/o with  Lupus Father  at age 71 of [place] 
2 brother and they are healthy. DEXA scan: below normal range for age No family history on file. Visit Vitals /80 Pulse (!) 105 Resp 18 Ht 6' 5\" (1.956 m) Wt 71.2 kg (157 lb) SpO2 97% BMI 18.62 kg/m² Review of Systems: A comprehensive review of systems was negative except for that written in the History of Present Illness. Keppra 8/15: none detected VPA 8/15: 27 Neuro Exam:  
 
Appearance: The patient is well developed, well nourished, provides a coherent history and is in no acute distress. Mental Status: Oriented to time, place and person. Mood and affect appropriate. Cranial Nerves:   Intact visual fields. Fundi are benign. GIOVANI, EOM's full, no nystagmus, no ptosis. Facial sensation is normal. Corneal reflexes are intact. Facial movement is symmetric. Hearing is normal bilaterally. Palate is midline with normal sternocleidomastoid and trapezius muscles are normal. Tongue is midline. Motor:  5/5 strength in upper and lower proximal and distal muscles. Normal bulk and tone. No fasciculations. Reflexes:   Deep tendon reflexes 1+/4 and symmetrical.  
Sensory:   Grossly intact Gait:  Normal gait. Tremor:   Mild head tremor Cerebellar:  No cerebellar signs present. Neurovascular:  Normal heart sounds and regular rhythm, peripheral pulses intact, and no carotid bruits. Assessment: Mr. Puneet Hayes was seen today for the evaluation of generalized seizures and tremors. No seizures reported, and tolerating medication well. He reports he is taking medications as directed. Will refill today. Discussed with patient that I would like updated blood work. He states that he can get this at his primary care physician and send it to us. No toxic side effects of medications. Essential tremor is stable with primidone and propranolol. With stress and too much caffeine it is exacerbated. Plan:  
 
-continuing on Keppra 750/1500 and Depakote 500/500 for seizures. Refill given for both 
-cont Inderal LA 60mg daily and primidone 250mg BID for tremors. Refill given for both 
-patient was counseled to stop smoking tobacco. 
-DEXA scan below normal range- will do Vit D and Calcium 
-Pt counseled on taking meds appropriately. -recheck levels today. Patient will get this at PCP 
 
-follow up in 6 months with Dr. Lizette Damon Signed by :  Noa Vera MD 2/26/2019 Medications and side effects discussed with patient in detail. With any new medications prescribed, patient was given instructions on administration and side effects. Written medication information was provided to the patient as well. This note was created using voice recognition software. Despite editing, there may be syntax errors. This note will not be viewable in [address]

## 2019-02-26 NOTE — PROGRESS NOTES
Chief Complaint Patient presents with  Seizure Visit Vitals /80 Pulse (!) 105 Resp 18 Ht 6' 5\" (1.956 m) Wt 71.2 kg (157 lb) SpO2 97% BMI 18.62 kg/m²

## 2019-02-27 DIAGNOSIS — G40.209 PARTIAL EPILEPSY WITH IMPAIRMENT OF CONSCIOUSNESS (HCC): ICD-10-CM

## 2019-08-26 ENCOUNTER — OFFICE VISIT (OUTPATIENT)
Dept: NEUROLOGY | Age: 44
End: 2019-08-26

## 2019-08-26 VITALS
HEIGHT: 77 IN | RESPIRATION RATE: 16 BRPM | OXYGEN SATURATION: 98 % | SYSTOLIC BLOOD PRESSURE: 134 MMHG | TEMPERATURE: 98.4 F | WEIGHT: 159 LBS | BODY MASS INDEX: 18.77 KG/M2 | HEART RATE: 76 BPM | DIASTOLIC BLOOD PRESSURE: 88 MMHG

## 2019-08-26 DIAGNOSIS — G25.0 ESSENTIAL TREMOR: ICD-10-CM

## 2019-08-26 DIAGNOSIS — Z09 FOLLOW-UP EXAMINATION FOLLOWING TREATMENT WITH HIGH-RISK MEDICATION: ICD-10-CM

## 2019-08-26 DIAGNOSIS — G40.209 PARTIAL EPILEPSY WITH IMPAIRMENT OF CONSCIOUSNESS (HCC): Primary | ICD-10-CM

## 2019-08-26 RX ORDER — LEVETIRACETAM 750 MG/1
TABLET ORAL
Qty: 90 TAB | Refills: 2 | Status: SHIPPED | OUTPATIENT
Start: 2019-08-26 | End: 2020-08-25 | Stop reason: SDUPTHER

## 2019-08-26 RX ORDER — DIVALPROEX SODIUM 500 MG/1
TABLET, EXTENDED RELEASE ORAL
Qty: 60 TAB | Refills: 2 | Status: SHIPPED | OUTPATIENT
Start: 2019-08-26 | End: 2020-08-25

## 2019-08-26 RX ORDER — PRIMIDONE 250 MG/1
250 TABLET ORAL 2 TIMES DAILY
Qty: 60 TAB | Refills: 2 | Status: SHIPPED | OUTPATIENT
Start: 2019-08-26 | End: 2020-08-25 | Stop reason: SDUPTHER

## 2019-08-26 RX ORDER — PROPRANOLOL HYDROCHLORIDE 80 MG/1
CAPSULE, EXTENDED RELEASE ORAL
Qty: 30 CAP | Refills: 2 | Status: SHIPPED | OUTPATIENT
Start: 2019-08-26 | End: 2020-08-25 | Stop reason: SDUPTHER

## 2019-08-26 NOTE — PROGRESS NOTES
Neurology Progress Note    Patient ID:   Jumana Velazco  [id number]  30 y.o.  1975    Date of Office Visit: 08/26/19    Chief Complaint   Patient presents with    Seizure     stable     Interval Hx:       Pt previously followed by Dr Marilou Mcdonald (last visit 2-2019) before she left practice. Pt was to see Dr Carmen Gaines today but he was doing Hospital rounds. I was asked to see in his place. Pt denies any seizures since last visit and says he thinks last seizure was about 12 years ago. He remains on Keppra 750 AM/ 1500 mg PM and Depakote  mg twice a day, although Dr Alek Vera questioned his compliance in the past (see her note). Given lab slips at last visit, haven't been checked yet. Also has ET, on Primidone 250 mg BID, Propranolol LA 60 mg/ day. Pt says the tremors bother him daily. Hard to hold things, worse when he's stressed or if he drinks coffee. Per Dr Napoleon Torres last note: last VPA free level was 5.4 on 11/17  Per Labs review:   Last labs (8-2015, scanned in):  Levetiracetam: not detected,   Depakote: 25 (subtherapeutic, rr )      Brief ROS: as noted above or otherwise negative      Objective:     No Known Allergies    Current Outpatient Medications on File Prior to Visit   Medication Sig    albuterol (PROVENTIL HFA, VENTOLIN HFA, PROAIR HFA) 90 mcg/actuation inhaler Take  by inhalation.  sertraline (ZOLOFT) 100 mg tablet Take  by mouth daily. No current facility-administered medications on file prior to visit. PMHx:   Past Medical History:   Diagnosis Date    Unspecified epilepsy without mention of intractable epilepsy      PSHx:  has a past surgical history that includes hx orthopaedic. SocHx:  reports that he has been smoking. He has been smoking about 1.00 pack per day. He does not have any smokeless tobacco history on file. He reports that he does not drink alcohol or use drugs. FHx: family history is not on file.        Physical Exam  Vitals:    08/26/19 1018   BP: 134/88   Pulse: 76   Resp: 16   Temp: 98.4 °F (36.9 °C)   TempSrc: Oral   SpO2: 98%   Weight: 72.1 kg (159 lb)   Height: 6' 5\" (1.956 m)       General: Pleasant, tall male, NAD, cooperative, alert, mild slowing speech (? Mild intellectual disability)    Mental status: Awake, alert, cooperative. Neck: supple   Extremities: no edema  Skin: no rashes    Neuro Exam:    CNs:   Facial movements: symmetric. Visual fields; intact in all quadrants, bilateral EOM: conjugate eye movements bilateral  Hearing: normal  Speech: no aphasia, no dysarthria, normal fluency    Motor:  Bulk: Normal, symmetric in all exts  Tone: normal in all extremities  Strength: 5/5 in upper and lower extremities, symmetric. Coordination: No resting tremor but + mild postural tremors. Sensory: intact to LT in all exts. Reflexes:  1+ patellars  Gait: normal    Assessment:       ICD-10-CM ICD-9-CM    1. 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 Essential tremor G25.0 333.1 primidone (MYSOLINE) 250 mg tablet      propranolol LA (INDERAL LA) 80 mg SR capsule   3.  Follow-up examination following treatment with high-risk medication Z09 V67.51 divalproex ER (DEPAKOTE ER) 500 mg ER tablet      levETIRAcetam (KEPPRA) 750 mg tablet      primidone (MYSOLINE) 250 mg tablet      propranolol LA (INDERAL LA) 80 mg SR capsule       Check labs (Depakote level, Keppra level, CMP, CBC)  Increased Propranolol LA to 80 mg/day for ET  Kept Primidone the same  Kept Depakote and Keppra doses the same  F/u with Dr Michell Grigsby in 3 months      Signed By: Keith Basilio MD     August 26, 2019

## 2019-08-26 NOTE — PATIENT INSTRUCTIONS
1. For the Essential Tremor (hand tremors) I increased your Propranolol LA from 60 mg to 80 mg. Take one 80 mg tablet a day. 2. Have your labwork checked before you next office visit in 3 months, with Dr Dena Lennox    3. The Primidone, Keppra, and Depakote doses were all kept the same    4. Follow up with Dr Dena Lennox in 3 months                      [facility]   Statement to Patients  April 1, 2014      In an effort to ensure the large volume of patient prescription refills is processed in the most efficient and expeditious manner, we are asking our patients to assist us by calling your Pharmacy for all prescription refills, this will include also your  Mail Order Pharmacy. The pharmacy will contact our office electronically to continue the refill process. Please do not wait until the last minute to call your pharmacy. We need at least 48 hours (2days) to fill prescriptions. We also encourage you to call your pharmacy before going to  your prescription to make sure it is ready. With regard to controlled substance prescription refill requests (narcotic refills) that need to be picked up at our office, we ask your cooperation by providing us with at least 72 hours (3days) notice that you will need a refill. We will not refill narcotic prescription refill requests after 4:00pm on any weekday, Monday through Thursday, or after 2:00pm on Fridays, or on the weekends. We encourage everyone to explore another way of getting your prescription refill request processed using [employer], our patient web portal through our electronic medical record system. [employer] is an efficient and effective way to communicate your medication request directly to the office and  downloadable as an angie on your smart phone . [employer] also features a review functionality that allows you to view your medication list as well as leave messages for your physician.  Are you ready to get connected? If so please review the attatched instructions or speak to any of our staff to get you set up right away! Thank you so much for your cooperation. Should you have any questions please contact our Practice Administrator.     The Physicians and Staff,  [facility]

## 2019-08-26 NOTE — PROGRESS NOTES
Visit Vitals  /88 (BP 1 Location: Left arm, BP Patient Position: Sitting)   Pulse 76   Temp 98.4 °F (36.9 °C) (Oral)   Resp 16   Ht 6' 5\" (1.956 m)   Wt 72.1 kg (159 lb)   SpO2 98%   BMI 18.85 kg/m²       Chief Complaint   Patient presents with    Seizure     stable

## 2020-08-05 ENCOUNTER — TELEPHONE (OUTPATIENT)
Dept: NEUROLOGY | Age: 45
End: 2020-08-05

## 2020-08-05 NOTE — TELEPHONE ENCOUNTER
Returned call. Scheduled for 8/25/20. appt had to be a minimum of 5 business days away due to transportation scheduling.

## 2020-08-05 NOTE — TELEPHONE ENCOUNTER
----- Message from Gloria Geller sent at 8/5/2020 12:12 PM EDT -----  Regarding: dr boles/ telephone  General Message/Vendor Calls    Caller's first and last name: Kimberly Chan,        Reason for call: to r/s the pt's f/u visit       Callback required yes/no and why: yes      Best contact number(s): [phone]      Details to clarify the request:      Billy Tavares

## 2020-08-25 ENCOUNTER — OFFICE VISIT (OUTPATIENT)
Dept: NEUROLOGY | Age: 45
End: 2020-08-25
Payer: MEDICARE

## 2020-08-25 VITALS
HEART RATE: 83 BPM | WEIGHT: 165 LBS | BODY MASS INDEX: 19.09 KG/M2 | HEIGHT: 78 IN | SYSTOLIC BLOOD PRESSURE: 136 MMHG | DIASTOLIC BLOOD PRESSURE: 74 MMHG | OXYGEN SATURATION: 97 % | TEMPERATURE: 98 F

## 2020-08-25 DIAGNOSIS — G25.0 ESSENTIAL TREMOR: ICD-10-CM

## 2020-08-25 DIAGNOSIS — G40.209 PARTIAL EPILEPSY WITH IMPAIRMENT OF CONSCIOUSNESS (HCC): Primary | ICD-10-CM

## 2020-08-25 DIAGNOSIS — G40.209 PARTIAL EPILEPSY WITH IMPAIRMENT OF CONSCIOUSNESS (HCC): ICD-10-CM

## 2020-08-25 PROCEDURE — G8427 DOCREV CUR MEDS BY ELIG CLIN: HCPCS | Performed by: PSYCHIATRY & NEUROLOGY

## 2020-08-25 PROCEDURE — 99214 OFFICE O/P EST MOD 30 MIN: CPT | Performed by: PSYCHIATRY & NEUROLOGY

## 2020-08-25 PROCEDURE — G8420 CALC BMI NORM PARAMETERS: HCPCS | Performed by: PSYCHIATRY & NEUROLOGY

## 2020-08-25 PROCEDURE — G8432 DEP SCR NOT DOC, RNG: HCPCS | Performed by: PSYCHIATRY & NEUROLOGY

## 2020-08-25 RX ORDER — LEVETIRACETAM 750 MG/1
TABLET ORAL
Qty: 90 TAB | Refills: 2 | Status: SHIPPED | OUTPATIENT
Start: 2020-08-25 | End: 2020-10-26 | Stop reason: SDUPTHER

## 2020-08-25 RX ORDER — OMEPRAZOLE 20 MG/1
CAPSULE, DELAYED RELEASE ORAL
COMMUNITY
Start: 2020-07-01

## 2020-08-25 RX ORDER — DIVALPROEX SODIUM 500 MG/1
1000 TABLET, EXTENDED RELEASE ORAL DAILY
Qty: 60 TAB | Refills: 2 | Status: SHIPPED | OUTPATIENT
Start: 2020-08-25 | End: 2020-10-26 | Stop reason: SDUPTHER

## 2020-08-25 RX ORDER — PROPRANOLOL HYDROCHLORIDE 80 MG/1
80 CAPSULE, EXTENDED RELEASE ORAL DAILY
Qty: 30 CAP | Refills: 2 | Status: SHIPPED | OUTPATIENT
Start: 2020-08-25 | End: 2020-10-26

## 2020-08-25 RX ORDER — PRIMIDONE 250 MG/1
250 TABLET ORAL 2 TIMES DAILY
Qty: 60 TAB | Refills: 2 | Status: SHIPPED | OUTPATIENT
Start: 2020-08-25 | End: 2020-10-26 | Stop reason: SDUPTHER

## 2020-08-25 NOTE — PROGRESS NOTES
Neurology Progress Note    Patient ID:   Nellie Gerard  [id number]  39 y.o.  1975    Date of Office Visit: 08/25/20    Chief Complaint   Patient presents with    Seizure     Interval Hx:     Seen once by me in Aug 2019 as transition of care from Dr Jose Carlos Valiente who left the practice  Pt was to see Dr Roslyn Dave but he was on call so I was asked to see patient  Pt was to see Dr Roslyn Dave 3 months after that visit, but he no-showed    I reviewed his most recent labs, done by PCP in June 2020  Depakote level is < 4, Keppra level is < 1, consistent with non-compliance  Dr Shabnam Garcia prior notes suggested the same    Pt says he may have been out of his meds, couldn't afford to pick them up  He denies having any seizures since his last visit  At his last visit with me, he said last seizure was 12 years prior    He reports that he continues to take Primidone 250 mg BID, Propanolol LA 80 mg/ day, \"and\" his seizure meds Keppra 750 mg AM/ 1500 mg PM and Depakote  mg twice a day (when he can afford them). Brief ROS: as noted above or otherwise negative    ===============    Brief Hx:     Pt previously followed by Dr Rosalia Valentine (last visit 2-2019) before she left practice. Pt was to see Dr Roslyn Dave today but he was doing Hospital rounds. I was asked to see in his place. Pt denies any seizures since last visit and says he thinks last seizure was about 12 years ago. He remains on Keppra 750 AM/ 1500 mg PM and Depakote  mg twice a day, although Dr Jose Carlos Valiente questioned his compliance in the past (see her note). Given lab slips at last visit, haven't been checked yet. Also has ET, on Primidone 250 mg BID, Propranolol LA 60 mg/ day. Pt says the tremors bother him daily. Hard to hold things, worse when he's stressed or if he drinks coffee.       Per Dr Shabnam Garcia last note: last VPA free level was 5.4 on 11/17  Per Labs review:   Last labs (8-2015, scanned in):  Levetiracetam: not detected,   Depakote: 25 (subtherapeutic, rr )    Objective:     No Known Allergies    Current Outpatient Medications on File Prior to Visit   Medication Sig    omeprazole (PRILOSEC) 20 mg capsule     sertraline (ZOLOFT) 100 mg tablet Take  by mouth daily.  albuterol (PROVENTIL HFA, VENTOLIN HFA, PROAIR HFA) 90 mcg/actuation inhaler Take  by inhalation. No current facility-administered medications on file prior to visit. PMHx:   Past Medical History:   Diagnosis Date    Unspecified epilepsy without mention of intractable epilepsy      PSHx:  has a past surgical history that includes hx orthopaedic. SocHx:  reports that he has been smoking. He has been smoking about 1.00 pack per day. He has never used smokeless tobacco. He reports that he does not drink alcohol or use drugs. FHx: family history is not on file. Physical Exam  Vitals:    08/25/20 1103   BP: 136/74   Pulse: 83   Temp: 98 °F (36.7 °C)   SpO2: 97%   Weight: 74.8 kg (165 lb)   Height: 6' 6\" (1.981 m)       General: Pleasant, tall male, NAD, cooperative, alert, mild slowing speech (? Mild intellectual disability)    Mental status: Awake, alert, cooperative. Neck: supple   Extremities: no edema  Skin: no rashes    Neuro Exam:    CNs:   Facial movements: symmetric. Visual fields; intact in all quadrants, bilateral EOM: conjugate eye movements bilateral  Hearing: normal  Speech: no aphasia, no dysarthria, normal fluency    Motor:  Bulk: Normal, symmetric in all exts  Tone: normal in all extremities  Strength: 5/5 in upper and lower extremities, symmetric. Coordination: No resting tremor but + mild postural tremors (left > right)  Sensory: intact to LT in all exts. Reflexes:  Deferred  Gait: normal    Assessment:       ICD-10-CM ICD-9-CM    1. 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  Essential tremor  G25.0 333.1 propranolol LA (INDERAL LA) 80 mg SR capsule      primidone (MYSOLINE) 250 mg tablet      PRIMIDONE (MYSOLINE)        D/w patient that he should take his seizure medications as prescribed. He reports part of his problem is affording his medications or pharmacy not having them in stock. The medications are generic so they shouldn't cost much but that varies. He preferred to fill his meds monthly as opposed to 90 day Rx d/t cost considerations.       Renewed Rxs for:  Depakote  mg BID  Keppra 750 mg one in AM and 2 in PM  Propranolol LA 80 mg/ day  Primidone 250 mg BID    Given lab slips to have primidone, lev, and depakote levels checked 1 week prior to visit with Dr Colton Fernandez (in 2 months)    Signed By: Veena Kendall MD     August 25, 2020

## 2020-10-21 LAB
LEVETIRACETAM SERPL-MCNC: 5.1 UG/ML (ref 10–40)
PHENOBARB SERPL-MCNC: NORMAL UG/ML (ref 15–40)
PRIMIDONE SERPL-MCNC: NORMAL UG/ML (ref 5–12)
VALPROATE FREE SERPL-MCNC: 4.1 UG/ML (ref 6–22)

## 2020-10-26 ENCOUNTER — OFFICE VISIT (OUTPATIENT)
Dept: NEUROLOGY | Age: 45
End: 2020-10-26
Payer: MEDICARE

## 2020-10-26 VITALS
SYSTOLIC BLOOD PRESSURE: 130 MMHG | RESPIRATION RATE: 18 BRPM | HEART RATE: 78 BPM | OXYGEN SATURATION: 96 % | DIASTOLIC BLOOD PRESSURE: 68 MMHG | HEIGHT: 78 IN | TEMPERATURE: 98.1 F | BODY MASS INDEX: 20.48 KG/M2 | WEIGHT: 177 LBS

## 2020-10-26 DIAGNOSIS — Z51.81 MEDICATION MONITORING ENCOUNTER: ICD-10-CM

## 2020-10-26 DIAGNOSIS — G25.0 ESSENTIAL TREMOR: ICD-10-CM

## 2020-10-26 DIAGNOSIS — G40.319 GENERALIZED IDIOPATHIC EPILEPSY AND EPILEPTIC SYNDROMES, INTRACTABLE, WITHOUT STATUS EPILEPTICUS (HCC): Primary | ICD-10-CM

## 2020-10-26 DIAGNOSIS — Z09 FOLLOW-UP EXAMINATION FOLLOWING TREATMENT WITH HIGH-RISK MEDICATION: ICD-10-CM

## 2020-10-26 PROCEDURE — G8427 DOCREV CUR MEDS BY ELIG CLIN: HCPCS | Performed by: PSYCHIATRY & NEUROLOGY

## 2020-10-26 PROCEDURE — 99214 OFFICE O/P EST MOD 30 MIN: CPT | Performed by: PSYCHIATRY & NEUROLOGY

## 2020-10-26 PROCEDURE — G8420 CALC BMI NORM PARAMETERS: HCPCS | Performed by: PSYCHIATRY & NEUROLOGY

## 2020-10-26 PROCEDURE — G8510 SCR DEP NEG, NO PLAN REQD: HCPCS | Performed by: PSYCHIATRY & NEUROLOGY

## 2020-10-26 RX ORDER — LEVETIRACETAM 750 MG/1
TABLET ORAL
Qty: 90 TAB | Refills: 11 | Status: SHIPPED | OUTPATIENT
Start: 2020-10-26 | End: 2021-10-26 | Stop reason: SDUPTHER

## 2020-10-26 RX ORDER — PRIMIDONE 250 MG/1
250 TABLET ORAL 2 TIMES DAILY
Qty: 60 TAB | Refills: 11 | Status: SHIPPED | OUTPATIENT
Start: 2020-10-26 | End: 2021-08-27

## 2020-10-26 RX ORDER — DIVALPROEX SODIUM 500 MG/1
1000 TABLET, EXTENDED RELEASE ORAL DAILY
Qty: 60 TAB | Refills: 11 | Status: SHIPPED | OUTPATIENT
Start: 2020-10-26 | End: 2021-10-26

## 2020-10-26 RX ORDER — PROPRANOLOL HYDROCHLORIDE 60 MG/1
60 CAPSULE, EXTENDED RELEASE ORAL DAILY
Qty: 30 CAP | Refills: 11 | Status: SHIPPED | OUTPATIENT
Start: 2020-10-26 | End: 2021-10-26

## 2020-10-26 NOTE — PROGRESS NOTES
[facility] and [address] at [facility] Neurology Clinics at [address] [facility] [place], [address] [facility] [address] [facility], [address] [place] [address]   [phone]              Chief Complaint   Patient presents with    Epilepsy     Current Outpatient Medications   Medication Sig Dispense Refill    omeprazole (PRILOSEC) 20 mg capsule       divalproex ER (DEPAKOTE ER) 500 mg ER tablet Take 2 Tabs by mouth daily. Anti-seizure meds 60 Tab 2    levETIRAcetam (KEPPRA) 750 mg tablet TAKE 1 TABLET BY MOUTH EVERY MORNING AND 2 TABLETS BY MOUTH AT NIGHT. 90 Tab 2    propranolol LA (INDERAL LA) 80 mg SR capsule Take 1 Cap by mouth daily. Indications: essential tremor 30 Cap 2    primidone (MYSOLINE) 250 mg tablet Take 1 Tab by mouth two (2) times a day. Indications: essential tremor 60 Tab 2    albuterol (PROVENTIL HFA, VENTOLIN HFA, PROAIR HFA) 90 mcg/actuation inhaler Take  by inhalation.  sertraline (ZOLOFT) 100 mg tablet Take  by mouth daily. No Known Allergies  Social History     Tobacco Use    Smoking status: Current Every Day Smoker     Packs/day: 1.00    Smokeless tobacco: Never Used   Substance Use Topics    Alcohol use: No    Drug use: No     15-year-old male returns today for follow-up epilepsy. Previously seen by Dr. Breana Stephens prior to her departure to [address] [place] [address]. He then saw Dr. Ketty Aguayo in August of this year for general follow-up. He has not had a seizure in at least 8 years. He is on Keppra and Depakote. Per chart review there is some issue of medication compliance at times. He also has essential tremor on Mysoline and Inderal.  At that time he was on Mysoline 250 mg twice daily and Inderal LA 60 mg daily. Tremor was bothersome. Hulen Overlie was increased to a dose of 180 mg daily. Laboratory analyses were ordered.     Review of the labs finds blood was drawn on 19 October  Primidone not detected  Phenobarbital not detected  Depakote 4.1  Keppra 5.1    Today he endorses remaining seizure-free. He also endorses compliance with his medications. He says he may have missed half a dose or a half a days dose of medicine. He is not taking the higher dose of Inderal.  He says it made him feel poorly after 1 dose and he threw it away. Likewise he is not taking the 60 mg either. He overall was happy with his tremor control he says on the 60 mg. He has not been ill. No fever chills. No cough. No chest pain. Review of systems  Pertinent positives and negatives as noted with remainder of comprehensive review negative    Examination  Visit Vitals  /68 (BP 1 Location: Left arm, BP Patient Position: Sitting)   Pulse 78   Temp 98.1 °F (36.7 °C)   Resp 18   Ht 6' 6\" (1.981 m)   Wt 80.3 kg (177 lb)   SpO2 96%   BMI 20.45 kg/m²     He is awake alert and oriented. Normal speech and language. Normal cognitive function. No icterus. Full versions. No nystagmus. No pronation or drift. Mild postural tremor of the outstretched hands with intention on finger-nose-finger. No cogwheeling. Full strength in the upper and lower extremities in all muscle groups today testing. Reflexes symmetrical.  No ataxia. Impression/Plan  Epilepsy generalized without any reported seizure event-continue with Keppra and Depakote    Essential tremor--perhaps a bit worse on exam today but he is not taking the Inderal and he had poor reactivity to higher dose of Inderal.  Reorder 60 mg Inderal LA 1 daily continue with Mysoline    Low drug levels question compliance chart review notes that there have been issues in the past.  Reinforced compliance    Follow yearly unless circumstances dictate otherwise      Estefany Zhang MD      This note was created using voice recognition software. Despite editing, there may be syntax errors.

## 2020-10-26 NOTE — LETTER
10/26/20 Patient: Daiana Shin YOB: 1975 Date of Visit: 10/26/2020 Juan Alcaraz MD 
[address] [place] [address] VIA Facsimile: [phone] Dear Juan Alcaraz MD, Thank you for referring Mr. Daiana Shin to [facility] [address] for evaluation. My notes for this consultation are attached. If you have questions, please do not hesitate to call me. I look forward to following your patient along with you. Sincerely, Lilia Soliman MD

## 2021-08-27 DIAGNOSIS — G25.0 ESSENTIAL TREMOR: ICD-10-CM

## 2021-08-27 RX ORDER — PRIMIDONE 250 MG/1
TABLET ORAL
Qty: 60 TABLET | Refills: 11 | Status: SHIPPED | OUTPATIENT
Start: 2021-08-27 | End: 2021-10-26 | Stop reason: SDUPTHER

## 2021-10-25 ENCOUNTER — TELEPHONE (OUTPATIENT)
Dept: NEUROLOGY | Age: 46
End: 2021-10-25

## 2021-10-25 NOTE — TELEPHONE ENCOUNTER
Spoke with patient and he is okay waiting to see Dr. Cates Comes until January but will need to have his medications refilled until then.

## 2021-10-26 DIAGNOSIS — G25.0 ESSENTIAL TREMOR: ICD-10-CM

## 2021-10-26 DIAGNOSIS — G40.319 GENERALIZED IDIOPATHIC EPILEPSY AND EPILEPTIC SYNDROMES, INTRACTABLE, WITHOUT STATUS EPILEPTICUS (HCC): ICD-10-CM

## 2021-10-26 RX ORDER — LEVETIRACETAM 750 MG/1
TABLET ORAL
Qty: 90 TABLET | Refills: 2 | Status: SHIPPED | OUTPATIENT
Start: 2021-10-26 | End: 2022-01-06 | Stop reason: SDUPTHER

## 2021-10-26 RX ORDER — PROPRANOLOL HYDROCHLORIDE 60 MG/1
CAPSULE, EXTENDED RELEASE ORAL
Qty: 30 CAPSULE | Refills: 2 | Status: SHIPPED | OUTPATIENT
Start: 2021-10-26 | End: 2022-01-06 | Stop reason: SDUPTHER

## 2021-10-26 RX ORDER — PRIMIDONE 250 MG/1
250 TABLET ORAL 2 TIMES DAILY
Qty: 60 TABLET | Refills: 2 | Status: SHIPPED | OUTPATIENT
Start: 2021-10-26 | End: 2022-01-06 | Stop reason: SDUPTHER

## 2021-10-26 RX ORDER — DIVALPROEX SODIUM 500 MG/1
TABLET, EXTENDED RELEASE ORAL
Qty: 60 TABLET | Refills: 2 | Status: SHIPPED | OUTPATIENT
Start: 2021-10-26 | End: 2022-01-06 | Stop reason: SDUPTHER

## 2021-10-26 NOTE — TELEPHONE ENCOUNTER
Pt's f/u appt was moved to [facility]/ernesto PERRY being out of office. Sent refills for meds to last until f/u appt per VO Dr. Dahlia Whitaker.

## 2022-01-06 ENCOUNTER — OFFICE VISIT (OUTPATIENT)
Dept: NEUROLOGY | Age: 47
End: 2022-01-06
Payer: MEDICARE

## 2022-01-06 VITALS
DIASTOLIC BLOOD PRESSURE: 67 MMHG | WEIGHT: 211 LBS | TEMPERATURE: 97.7 F | HEIGHT: 78 IN | HEART RATE: 69 BPM | OXYGEN SATURATION: 99 % | RESPIRATION RATE: 16 BRPM | BODY MASS INDEX: 24.41 KG/M2 | SYSTOLIC BLOOD PRESSURE: 116 MMHG

## 2022-01-06 DIAGNOSIS — G40.319 GENERALIZED IDIOPATHIC EPILEPSY AND EPILEPTIC SYNDROMES, INTRACTABLE, WITHOUT STATUS EPILEPTICUS (HCC): ICD-10-CM

## 2022-01-06 DIAGNOSIS — G25.0 ESSENTIAL TREMOR: ICD-10-CM

## 2022-01-06 PROCEDURE — G8432 DEP SCR NOT DOC, RNG: HCPCS | Performed by: PSYCHIATRY & NEUROLOGY

## 2022-01-06 PROCEDURE — G8420 CALC BMI NORM PARAMETERS: HCPCS | Performed by: PSYCHIATRY & NEUROLOGY

## 2022-01-06 PROCEDURE — G8427 DOCREV CUR MEDS BY ELIG CLIN: HCPCS | Performed by: PSYCHIATRY & NEUROLOGY

## 2022-01-06 PROCEDURE — 99214 OFFICE O/P EST MOD 30 MIN: CPT | Performed by: PSYCHIATRY & NEUROLOGY

## 2022-01-06 RX ORDER — LEVETIRACETAM 750 MG/1
TABLET ORAL
Qty: 90 TABLET | Refills: 11 | Status: SHIPPED | OUTPATIENT
Start: 2022-01-06

## 2022-01-06 RX ORDER — DIVALPROEX SODIUM 500 MG/1
1000 TABLET, EXTENDED RELEASE ORAL DAILY
Qty: 60 TABLET | Refills: 11 | Status: SHIPPED | OUTPATIENT
Start: 2022-01-06

## 2022-01-06 RX ORDER — PRIMIDONE 250 MG/1
250 TABLET ORAL 2 TIMES DAILY
Qty: 60 TABLET | Refills: 11 | Status: SHIPPED | OUTPATIENT
Start: 2022-01-06

## 2022-01-06 RX ORDER — PROPRANOLOL HYDROCHLORIDE 60 MG/1
60 CAPSULE, EXTENDED RELEASE ORAL DAILY
Qty: 30 CAPSULE | Refills: 11 | Status: SHIPPED | OUTPATIENT
Start: 2022-01-06

## 2022-01-06 NOTE — PROGRESS NOTES
[facility] Neurology Clinics and [address] at [facility] Neurology Clinics at [address] [place] [address], [address] [facility] [address] [facility], [address] [place] [address]   [phone]              Chief Complaint   Patient presents with    Epilepsy     no sz in yrs.  Tremors     propranolol helping to control     Current Outpatient Medications   Medication Sig Dispense Refill    propranolol LA (INDERAL LA) 60 mg SR capsule TAKE ONE CAPSULE BY MOUTH DAILY 30 Capsule 2    divalproex ER (DEPAKOTE ER) 500 mg ER tablet TAKE 2 TABLETS BY MOUTH DAILY 60 Tablet 2    primidone (MYSOLINE) 250 mg tablet Take 1 Tablet by mouth two (2) times a day. 60 Tablet 2    levETIRAcetam (KEPPRA) 750 mg tablet TAKE 1 TABLET BY MOUTH EVERY MORNING AND 2 TABLETS BY MOUTH AT NIGHT. 90 Tablet 2    omeprazole (PRILOSEC) 20 mg capsule       albuterol (PROVENTIL HFA, VENTOLIN HFA, PROAIR HFA) 90 mcg/actuation inhaler Take  by inhalation.  sertraline (ZOLOFT) 100 mg tablet Take  by mouth daily. No Known Allergies  Social History     Tobacco Use    Smoking status: Current Every Day Smoker     Packs/day: 1.00    Smokeless tobacco: Never Used   Substance Use Topics    Alcohol use: No    Drug use: No   Justin Osuna returns today for follow-up epilepsy. He has been maintained on Keppra and Depakote. Last visit with me was October 2020. He also has essential tremor maintained on Mysoline and Inderal LA. He remains seizure-free. He endorses compliance with his medicine with no perceived side effect. In terms of the tremor he notes he does very well with the medication.   Happy with how he is doing in that regard    Laboratory analysis October 19, 2020  Primidone not detected  Depakote 4.1-low  Keppra 5.1-low    Examination  Visit Vitals  /67 (BP 1 Location: Right arm, BP Patient Position: Sitting, BP Cuff Size: Adult)   Pulse 69   Temp 97.7 °F (36.5 °C)   Resp 16   Ht 6' 6\" (1.981 m)   Wt 95.7 kg (211 lb)   SpO2 99%   BMI 24.38 kg/m²     Pleasant gentleman. He is awake alert and oriented. Normal speech and language. Full versions. No nystagmus. He has minimal postural tremor of the outstretched hands with some mild head tremor. Minimal intention on finger-nose-finger. No ataxia    Impression/Plan  Epilepsy doing well  Continue Depakote and Keppra    Essential tremor doing well  Continue Mysoline and Inderal    As long as he continues to do well I will see him yearly    Josef Caraballo MD        This note was created using voice recognition software. Despite editing, there may be syntax errors.

## 2023-01-05 ENCOUNTER — OFFICE VISIT (OUTPATIENT)
Dept: NEUROLOGY | Age: 48
End: 2023-01-05
Payer: MEDICARE

## 2023-01-05 VITALS
OXYGEN SATURATION: 98 % | RESPIRATION RATE: 18 BRPM | DIASTOLIC BLOOD PRESSURE: 80 MMHG | BODY MASS INDEX: 24.15 KG/M2 | SYSTOLIC BLOOD PRESSURE: 145 MMHG | HEART RATE: 106 BPM | WEIGHT: 209 LBS

## 2023-01-05 DIAGNOSIS — G25.0 ESSENTIAL TREMOR: ICD-10-CM

## 2023-01-05 DIAGNOSIS — G40.319 GENERALIZED IDIOPATHIC EPILEPSY AND EPILEPTIC SYNDROMES, INTRACTABLE, WITHOUT STATUS EPILEPTICUS (HCC): ICD-10-CM

## 2023-01-05 PROCEDURE — G8432 DEP SCR NOT DOC, RNG: HCPCS | Performed by: PSYCHIATRY & NEUROLOGY

## 2023-01-05 PROCEDURE — G8420 CALC BMI NORM PARAMETERS: HCPCS | Performed by: PSYCHIATRY & NEUROLOGY

## 2023-01-05 PROCEDURE — 99214 OFFICE O/P EST MOD 30 MIN: CPT | Performed by: PSYCHIATRY & NEUROLOGY

## 2023-01-05 PROCEDURE — G8427 DOCREV CUR MEDS BY ELIG CLIN: HCPCS | Performed by: PSYCHIATRY & NEUROLOGY

## 2023-01-05 RX ORDER — LEVETIRACETAM 750 MG/1
TABLET ORAL
Qty: 90 TABLET | Refills: 11 | Status: SHIPPED | OUTPATIENT
Start: 2023-01-05

## 2023-01-05 RX ORDER — DIVALPROEX SODIUM 500 MG/1
1000 TABLET, EXTENDED RELEASE ORAL DAILY
Qty: 60 TABLET | Refills: 11 | Status: SHIPPED | OUTPATIENT
Start: 2023-01-05

## 2023-01-05 RX ORDER — PROPRANOLOL HYDROCHLORIDE 60 MG/1
60 CAPSULE, EXTENDED RELEASE ORAL DAILY
Qty: 30 CAPSULE | Refills: 11 | Status: SHIPPED | OUTPATIENT
Start: 2023-01-05

## 2023-01-05 RX ORDER — PRIMIDONE 250 MG/1
250 TABLET ORAL 2 TIMES DAILY
Qty: 60 TABLET | Refills: 11 | Status: SHIPPED | OUTPATIENT
Start: 2023-01-05

## 2023-01-05 NOTE — PROGRESS NOTES
[facility] Neurology Clinics and [address] at [facility] Neurology Clinics at [address] [facility] [place], [address] [facility] [address] [facility], [address] [place] [address]   [phone]              Chief Complaint   Patient presents with    Epilepsy     No recent sz    Tremors     Current Outpatient Medications   Medication Sig Dispense Refill    propranolol LA (INDERAL LA) 60 mg SR capsule Take 1 Capsule by mouth daily. 30 Capsule 11    divalproex ER (DEPAKOTE ER) 500 mg ER tablet Take 2 Tablets by mouth daily. 60 Tablet 11    primidone (MYSOLINE) 250 mg tablet Take 1 Tablet by mouth two (2) times a day. 60 Tablet 11    levETIRAcetam (KEPPRA) 750 mg tablet TAKE 1 TABLET BY MOUTH EVERY MORNING AND 2 TABLETS BY MOUTH AT NIGHT. 90 Tablet 11    omeprazole (PRILOSEC) 20 mg capsule       albuterol (PROVENTIL HFA, VENTOLIN HFA, PROAIR HFA) 90 mcg/actuation inhaler Take  by inhalation. sertraline (ZOLOFT) 100 mg tablet Take  by mouth daily. No Known Allergies  Social History     Tobacco Use    Smoking status: Every Day     Packs/day: 1.00     Types: Cigarettes    Smokeless tobacco: Never   Substance Use Topics    Alcohol use: No    Drug use: No     49-year-old man returns today for follow-up Essential tremor. Last visit was January 2022. He has been maintained on Mysoline and Inderal.  He also has epilepsy maintained on Depakote and Keppra no seizure. No occult seizure symptom. Tremor has been a bit worse and he attributes this to his mother passing 1 week ago. It was unexpected. He has been more tremulous. .  Tolerates medicines.     Most recent laboratory analysis September 13, 2022  CBC with a white count of 12.5 otherwise unremarkable  Metabolic panel unremarkable except for slightly elevated glucose  Lipid panel with an LDL of 114    Examination  Visit Vitals  BP (!) 145/80 (BP 1 Location: Left upper arm, BP Patient Position: Sitting, BP Cuff Size: Adult)   Pulse (!) 106   Resp 18   Wt 94.8 kg (209 lb)   SpO2 98%   BMI 24.15 kg/m²     He is a very pleasant gentleman. He is awake alert and oriented. He has normal speech and normal language. Normal cognition. No nystagmus. He has head tremor present. Postural tremor of the outstretched hands with intention on finger-nose-finger. Impression/Plan  Epilepsy stable  Continue Keppra and Depakote    Essential tremor a bit worse and we discussed that I would not find this out of the ordinary particularly with the stress of him losing his mother and we discussed how anxiety stress etc. can increase the tremor  Maintain current Inderal and Mysoline    As long as he does well follow-up 1 year    Ashley Roche MD        This note was created using voice recognition software. Despite editing, there may be syntax errors.

## 2023-05-17 RX ORDER — ALBUTEROL SULFATE 90 UG/1
AEROSOL, METERED RESPIRATORY (INHALATION)
COMMUNITY

## 2023-05-17 RX ORDER — PRIMIDONE 250 MG/1
250 TABLET ORAL 2 TIMES DAILY
COMMUNITY
Start: 2023-01-05

## 2023-05-17 RX ORDER — PROPRANOLOL HCL 60 MG
60 CAPSULE, EXTENDED RELEASE 24HR ORAL DAILY
COMMUNITY
Start: 2023-01-05

## 2023-05-17 RX ORDER — OMEPRAZOLE 20 MG/1
CAPSULE, DELAYED RELEASE ORAL
COMMUNITY
Start: 2020-07-01

## 2023-05-17 RX ORDER — LEVETIRACETAM 750 MG/1
TABLET ORAL
COMMUNITY
Start: 2023-01-05

## 2023-05-17 RX ORDER — SERTRALINE HYDROCHLORIDE 100 MG/1
TABLET, FILM COATED ORAL DAILY
COMMUNITY

## 2023-05-17 RX ORDER — DIVALPROEX SODIUM 500 MG/1
1000 TABLET, EXTENDED RELEASE ORAL DAILY
COMMUNITY
Start: 2023-01-05

## 2024-01-04 ENCOUNTER — OFFICE VISIT (OUTPATIENT)
Age: 49
End: 2024-01-04
Payer: MEDICARE

## 2024-01-04 VITALS
DIASTOLIC BLOOD PRESSURE: 73 MMHG | RESPIRATION RATE: 18 BRPM | SYSTOLIC BLOOD PRESSURE: 147 MMHG | HEART RATE: 64 BPM | OXYGEN SATURATION: 98 %

## 2024-01-04 DIAGNOSIS — G25.0 ESSENTIAL TREMOR: Primary | ICD-10-CM

## 2024-01-04 DIAGNOSIS — G40.319 GENERALIZED IDIOPATHIC EPILEPSY AND EPILEPTIC SYNDROMES, INTRACTABLE, WITHOUT STATUS EPILEPTICUS (HCC): ICD-10-CM

## 2024-01-04 PROCEDURE — G8420 CALC BMI NORM PARAMETERS: HCPCS | Performed by: PSYCHIATRY & NEUROLOGY

## 2024-01-04 PROCEDURE — 4004F PT TOBACCO SCREEN RCVD TLK: CPT | Performed by: PSYCHIATRY & NEUROLOGY

## 2024-01-04 PROCEDURE — 99214 OFFICE O/P EST MOD 30 MIN: CPT | Performed by: PSYCHIATRY & NEUROLOGY

## 2024-01-04 PROCEDURE — G8427 DOCREV CUR MEDS BY ELIG CLIN: HCPCS | Performed by: PSYCHIATRY & NEUROLOGY

## 2024-01-04 PROCEDURE — G8484 FLU IMMUNIZE NO ADMIN: HCPCS | Performed by: PSYCHIATRY & NEUROLOGY

## 2024-01-04 RX ORDER — PRIMIDONE 250 MG/1
250 TABLET ORAL 2 TIMES DAILY
Qty: 180 TABLET | Refills: 3 | Status: SHIPPED | OUTPATIENT
Start: 2024-01-04

## 2024-01-04 RX ORDER — LEVETIRACETAM 750 MG/1
TABLET ORAL
Qty: 270 TABLET | Refills: 3 | Status: SHIPPED | OUTPATIENT
Start: 2024-01-04

## 2024-01-04 RX ORDER — PROPRANOLOL HCL 60 MG
60 CAPSULE, EXTENDED RELEASE 24HR ORAL DAILY
Qty: 90 CAPSULE | Refills: 3 | Status: SHIPPED | OUTPATIENT
Start: 2024-01-04

## 2024-01-04 RX ORDER — DIVALPROEX SODIUM 500 MG/1
1000 TABLET, EXTENDED RELEASE ORAL DAILY
Qty: 180 TABLET | Refills: 3 | Status: SHIPPED | OUTPATIENT
Start: 2024-01-04

## 2024-01-04 ASSESSMENT — PATIENT HEALTH QUESTIONNAIRE - PHQ9
SUM OF ALL RESPONSES TO PHQ9 QUESTIONS 1 & 2: 0
2. FEELING DOWN, DEPRESSED OR HOPELESS: 0
SUM OF ALL RESPONSES TO PHQ QUESTIONS 1-9: 0
SUM OF ALL RESPONSES TO PHQ QUESTIONS 1-9: 0
1. LITTLE INTEREST OR PLEASURE IN DOING THINGS: 0
SUM OF ALL RESPONSES TO PHQ QUESTIONS 1-9: 0
SUM OF ALL RESPONSES TO PHQ QUESTIONS 1-9: 0

## 2024-01-04 NOTE — PROGRESS NOTES
[facility] Neurology Clinics and [facility] at [facility] Neurology Clinics at [address] [place] [address] [address] [facility] [address] [place] [address] [phone]              Chief Complaint   Patient presents with    Follow-up    Seizures     Stable      Current Outpatient Medications   Medication Sig Dispense Refill    albuterol sulfate HFA (PROVENTIL;VENTOLIN;PROAIR) 108 (90 Base) MCG/ACT inhaler Inhale into the lungs      divalproex (DEPAKOTE ER) 500 MG extended release tablet Take 2 tablets by mouth daily      levETIRAcetam (KEPPRA) 750 MG tablet TAKE 1 TABLET BY MOUTH EVERY MORNING AND 2 TABLETS BY MOUTH AT NIGHT.      omeprazole (PRILOSEC) 20 MG delayed release capsule ceived the following from [facility]: Outside name: omeprazole (PRILOSEC) 20 mg capsule      primidone (MYSOLINE) 250 MG tablet Take 1 tablet by mouth 2 times daily      propranolol (INDERAL LA) 60 MG extended release capsule Take 1 capsule by mouth daily      sertraline (ZOLOFT) 100 MG tablet Take by mouth daily       No current facility-administered medications for this visit.      No Known Allergies  Social History     Tobacco Use    Smoking status: Every Day     Current packs/day: 1.00     Types: Cigarettes    Smokeless tobacco: Never   Substance Use Topics    Alcohol use: No    Drug use: No     47-year-old gentleman following up essential tremor and generalized epilepsy.  He has been on Mysoline and Inderal for the tremor and Depakote and Keppra for seizure.  This is his yearly follow-up.    Today he reports he has been doing well.  No seizure.  Tremor is tolerable.  Tolerating medicine without difficulty.    Antiseizure medication  Depakote extended release 500 mg tablets--2 tablets daily  Keppra 750 mg tablets--1 tablet every morning and 2 tablets every afternoon    Examination  BP (!) 147/73 (Site: Left Upper Arm, Position: Sitting,

## 2025-01-09 ENCOUNTER — TELEPHONE (OUTPATIENT)
Age: 50
End: 2025-01-09

## 2025-01-09 NOTE — TELEPHONE ENCOUNTER
Patient calling to reschedule is follow up appt for Monday, 1/13. He states he needs more time to setup transportation. Please call him at  to get that rescheduled. Thank you.

## 2025-02-10 ENCOUNTER — OFFICE VISIT (OUTPATIENT)
Age: 50
End: 2025-02-10
Payer: MEDICARE

## 2025-02-10 VITALS
OXYGEN SATURATION: 99 % | DIASTOLIC BLOOD PRESSURE: 75 MMHG | WEIGHT: 209 LBS | RESPIRATION RATE: 16 BRPM | HEIGHT: 78 IN | HEART RATE: 85 BPM | SYSTOLIC BLOOD PRESSURE: 150 MMHG | BODY MASS INDEX: 24.18 KG/M2

## 2025-02-10 DIAGNOSIS — G40.319 GENERALIZED IDIOPATHIC EPILEPSY AND EPILEPTIC SYNDROMES, INTRACTABLE, WITHOUT STATUS EPILEPTICUS (HCC): Primary | ICD-10-CM

## 2025-02-10 DIAGNOSIS — G25.0 ESSENTIAL TREMOR: ICD-10-CM

## 2025-02-10 PROCEDURE — 99214 OFFICE O/P EST MOD 30 MIN: CPT | Performed by: PSYCHIATRY & NEUROLOGY

## 2025-02-10 PROCEDURE — 4004F PT TOBACCO SCREEN RCVD TLK: CPT | Performed by: PSYCHIATRY & NEUROLOGY

## 2025-02-10 PROCEDURE — G8428 CUR MEDS NOT DOCUMENT: HCPCS | Performed by: PSYCHIATRY & NEUROLOGY

## 2025-02-10 PROCEDURE — G8420 CALC BMI NORM PARAMETERS: HCPCS | Performed by: PSYCHIATRY & NEUROLOGY

## 2025-02-10 RX ORDER — PRIMIDONE 250 MG/1
250 TABLET ORAL 2 TIMES DAILY
Qty: 180 TABLET | Refills: 3 | Status: SHIPPED | OUTPATIENT
Start: 2025-02-10

## 2025-02-10 RX ORDER — DIVALPROEX SODIUM 500 MG/1
1000 TABLET, FILM COATED, EXTENDED RELEASE ORAL DAILY
Qty: 180 TABLET | Refills: 3 | Status: SHIPPED | OUTPATIENT
Start: 2025-02-10

## 2025-02-10 RX ORDER — PROPRANOLOL HYDROCHLORIDE 60 MG/1
60 CAPSULE, EXTENDED RELEASE ORAL DAILY
Qty: 90 CAPSULE | Refills: 3 | Status: SHIPPED | OUTPATIENT
Start: 2025-02-10

## 2025-02-10 RX ORDER — LEVETIRACETAM 750 MG/1
TABLET ORAL
Qty: 270 TABLET | Refills: 3 | Status: SHIPPED | OUTPATIENT
Start: 2025-02-10

## 2025-02-10 NOTE — PROGRESS NOTES
[facility] Neurology Clinics and [facility] at [facility] Neurology Clinics at [address] [place] [address] [address] [facility] [address] [place] [address] [phone]              Chief Complaint   Patient presents with    Seizures     Continue Keppra and Depakote    Tremors     Continue Mysoline, Inderal     Current Outpatient Medications   Medication Sig Dispense Refill    divalproex (DEPAKOTE ER) 500 MG extended release tablet Take 2 tablets by mouth daily 180 tablet 3    levETIRAcetam (KEPPRA) 750 MG tablet TAKE 1 TABLET BY MOUTH EVERY MORNING AND 2 TABLETS BY MOUTH AT NIGHT. 270 tablet 3    primidone (MYSOLINE) 250 MG tablet Take 1 tablet by mouth 2 times daily 180 tablet 3    propranolol (INDERAL LA) 60 MG extended release capsule Take 1 capsule by mouth daily 90 capsule 3    albuterol sulfate HFA (PROVENTIL;VENTOLIN;PROAIR) 108 (90 Base) MCG/ACT inhaler Inhale into the lungs      omeprazole (PRILOSEC) 20 MG delayed release capsule ceived the following from [facility]: Outside name: omeprazole (PRILOSEC) 20 mg capsule      sertraline (ZOLOFT) 100 MG tablet Take by mouth daily       No current facility-administered medications for this visit.      No Known Allergies  Social History     Tobacco Use    Smoking status: Every Day     Current packs/day: 1.00     Types: Cigarettes    Smokeless tobacco: Never   Substance Use Topics    Alcohol use: No    Drug use: No     History of Present Illness    49-year-old gentleman following up today for generalized epilepsy and essential tremor.  This is his yearly follow-up.  He remains seizure-free.  Tremor is tolerable.  Tolerates medications without difficulty.  For his tremor he is on Mysoline and Inderal.      Antiseizure medication  Depakote extended release 500 mg tablets--2 tablets daily  Keppra 750 mg tablets--1 tablet every morning and 2 tablets every